# Patient Record
Sex: FEMALE | Race: WHITE | Employment: FULL TIME | URBAN - METROPOLITAN AREA
[De-identification: names, ages, dates, MRNs, and addresses within clinical notes are randomized per-mention and may not be internally consistent; named-entity substitution may affect disease eponyms.]

---

## 2023-02-14 ENCOUNTER — HOSPITAL ENCOUNTER (EMERGENCY)
Age: 23
Discharge: HOME | End: 2023-02-14
Attending: EMERGENCY MEDICINE
Payer: COMMERCIAL

## 2023-02-14 VITALS
SYSTOLIC BLOOD PRESSURE: 112 MMHG | HEIGHT: 63 IN | DIASTOLIC BLOOD PRESSURE: 76 MMHG | TEMPERATURE: 99.6 F | RESPIRATION RATE: 20 BRPM | BODY MASS INDEX: 31.18 KG/M2 | HEART RATE: 71 BPM | WEIGHT: 176 LBS | OXYGEN SATURATION: 100 %

## 2023-02-14 DIAGNOSIS — R42 DIZZINESS: Primary | ICD-10-CM

## 2023-02-14 DIAGNOSIS — R55 NEAR SYNCOPE: ICD-10-CM

## 2023-02-14 LAB
ALBUMIN SERPL-MCNC: 4.1 G/DL (ref 3.5–5.2)
ALP BLD-CCNC: 119 U/L (ref 35–104)
ALT SERPL-CCNC: 15 U/L (ref 0–32)
ANION GAP SERPL CALCULATED.3IONS-SCNC: 11 MMOL/L (ref 7–16)
AST SERPL-CCNC: 18 U/L (ref 0–31)
BACTERIA: ABNORMAL /HPF
BASOPHILS ABSOLUTE: 0 E9/L (ref 0–0.2)
BASOPHILS RELATIVE PERCENT: 0 % (ref 0–2)
BILIRUB SERPL-MCNC: 0.5 MG/DL (ref 0–1.2)
BILIRUBIN URINE: NEGATIVE
BLOOD, URINE: NEGATIVE
BUN BLDV-MCNC: 10 MG/DL (ref 6–20)
CALCIUM SERPL-MCNC: 9.2 MG/DL (ref 8.6–10.2)
CHLORIDE BLD-SCNC: 103 MMOL/L (ref 98–107)
CLARITY: ABNORMAL
CO2: 24 MMOL/L (ref 22–29)
COLOR: YELLOW
CREAT SERPL-MCNC: 0.7 MG/DL (ref 0.5–1)
EKG ATRIAL RATE: 74 BPM
EKG P AXIS: 27 DEGREES
EKG P-R INTERVAL: 152 MS
EKG Q-T INTERVAL: 380 MS
EKG QRS DURATION: 80 MS
EKG QTC CALCULATION (BAZETT): 421 MS
EKG R AXIS: 78 DEGREES
EKG T AXIS: 44 DEGREES
EKG VENTRICULAR RATE: 74 BPM
EOSINOPHILS ABSOLUTE: 0 E9/L (ref 0.05–0.5)
EOSINOPHILS RELATIVE PERCENT: 0 % (ref 0–6)
EPITHELIAL CELLS, UA: ABNORMAL /HPF
GFR SERPL CREATININE-BSD FRML MDRD: >60 ML/MIN/1.73
GLUCOSE BLD-MCNC: 94 MG/DL (ref 74–99)
GLUCOSE URINE: NEGATIVE MG/DL
HCG, URINE, POC: NEGATIVE
HCT VFR BLD CALC: 42.1 % (ref 34–48)
HEMOGLOBIN: 13.3 G/DL (ref 11.5–15.5)
KETONES, URINE: NEGATIVE MG/DL
LEUKOCYTE ESTERASE, URINE: ABNORMAL
LYMPHOCYTES ABSOLUTE: 3.91 E9/L (ref 1.5–4)
LYMPHOCYTES RELATIVE PERCENT: 38 % (ref 20–42)
Lab: NORMAL
MCH RBC QN AUTO: 25.3 PG (ref 26–35)
MCHC RBC AUTO-ENTMCNC: 31.6 % (ref 32–34.5)
MCV RBC AUTO: 80 FL (ref 80–99.9)
MONOCYTES ABSOLUTE: 0.93 E9/L (ref 0.1–0.95)
MONOCYTES RELATIVE PERCENT: 9 % (ref 2–12)
NEGATIVE QC PASS/FAIL: NORMAL
NEUTROPHILS ABSOLUTE: 5.46 E9/L (ref 1.8–7.3)
NEUTROPHILS RELATIVE PERCENT: 53 % (ref 43–80)
NITRITE, URINE: NEGATIVE
PDW BLD-RTO: 13.2 FL (ref 11.5–15)
PH UA: 6.5 (ref 5–9)
PLATELET # BLD: 298 E9/L (ref 130–450)
PMV BLD AUTO: 9.6 FL (ref 7–12)
POSITIVE QC PASS/FAIL: NORMAL
POTASSIUM REFLEX MAGNESIUM: 4.1 MMOL/L (ref 3.5–5)
PROTEIN UA: NEGATIVE MG/DL
RBC # BLD: 5.26 E12/L (ref 3.5–5.5)
RBC UA: ABNORMAL /HPF (ref 0–2)
SODIUM BLD-SCNC: 138 MMOL/L (ref 132–146)
SPECIFIC GRAVITY UA: 1.02 (ref 1–1.03)
TOTAL PROTEIN: 7 G/DL (ref 6.4–8.3)
TROPONIN, HIGH SENSITIVITY: <6 NG/L (ref 0–9)
UROBILINOGEN, URINE: 0.2 E.U./DL
WBC # BLD: 10.3 E9/L (ref 4.5–11.5)
WBC UA: ABNORMAL /HPF (ref 0–5)

## 2023-02-14 PROCEDURE — 84484 ASSAY OF TROPONIN QUANT: CPT

## 2023-02-14 PROCEDURE — 99284 EMERGENCY DEPT VISIT MOD MDM: CPT

## 2023-02-14 PROCEDURE — 81001 URINALYSIS AUTO W/SCOPE: CPT

## 2023-02-14 PROCEDURE — 85025 COMPLETE CBC W/AUTO DIFF WBC: CPT

## 2023-02-14 PROCEDURE — 2580000003 HC RX 258: Performed by: STUDENT IN AN ORGANIZED HEALTH CARE EDUCATION/TRAINING PROGRAM

## 2023-02-14 PROCEDURE — 93010 ELECTROCARDIOGRAM REPORT: CPT | Performed by: INTERNAL MEDICINE

## 2023-02-14 PROCEDURE — 93005 ELECTROCARDIOGRAM TRACING: CPT | Performed by: STUDENT IN AN ORGANIZED HEALTH CARE EDUCATION/TRAINING PROGRAM

## 2023-02-14 PROCEDURE — 80053 COMPREHEN METABOLIC PANEL: CPT

## 2023-02-14 RX ORDER — RIZATRIPTAN BENZOATE 5 MG/1
5 TABLET ORAL
COMMUNITY

## 2023-02-14 RX ORDER — 0.9 % SODIUM CHLORIDE 0.9 %
1000 INTRAVENOUS SOLUTION INTRAVENOUS ONCE
Status: COMPLETED | OUTPATIENT
Start: 2023-02-14 | End: 2023-02-14

## 2023-02-14 RX ORDER — PANTOPRAZOLE SODIUM 40 MG/1
40 GRANULE, DELAYED RELEASE ORAL
COMMUNITY

## 2023-02-14 RX ORDER — ONDANSETRON 4 MG/1
4 TABLET, FILM COATED ORAL EVERY 8 HOURS PRN
COMMUNITY

## 2023-02-14 RX ADMIN — SODIUM CHLORIDE 1000 ML: 9 INJECTION, SOLUTION INTRAVENOUS at 04:40

## 2023-02-14 ASSESSMENT — PAIN - FUNCTIONAL ASSESSMENT
PAIN_FUNCTIONAL_ASSESSMENT: NONE - DENIES PAIN
PAIN_FUNCTIONAL_ASSESSMENT: NONE - DENIES PAIN

## 2023-02-14 ASSESSMENT — LIFESTYLE VARIABLES: HOW OFTEN DO YOU HAVE A DRINK CONTAINING ALCOHOL: NEVER

## 2023-02-14 NOTE — DISCHARGE INSTRUCTIONS
Increase fluids  Follow-up with primary care doctor  If you notice any new worrisome symptoms please return to emergency department for evaluation

## 2023-02-14 NOTE — LETTER
[facility] Emergency Department  [address]   Phone: [phone]               February 14, 2023    Patient: Kita Lucas   YOB: 2000   Date of Visit: 2/14/2023       To Whom It May Concern:    Kita Lucas was seen and treated in our emergency department on 2/14/2023. She can return to work Tuesday 02/14/2023.       Sincerely,       Estefanía Silverman RN BSN CCRN [facility]        Signature:__________________________________

## 2023-02-14 NOTE — ED PROVIDER NOTES
[address]        Pt Name: Denise Hermosillo  MRN: [id number]  [place] 2000  Date of evaluation: 2/14/2023  Provider: Valerie Nuñez DO  PCP: No primary care provider on file. Note Started: 6:18 AM EST 2/14/23    CHIEF COMPLAINT       Chief Complaint   Patient presents with    Dizziness     Pt states that around 2300 the dizziness started, pt states that she started a new migraine medication that tends to make her dizzy but not this bad       HISTORY OF PRESENT ILLNESS: 1 or more Elements   History From: Patient    Limitations to history : None    Denise Hermosillo is a 20 y.o. female Past medical history migraines as well as IBS. Patient presents with chief complaint of dizziness and near syncopal episodes. Patient states that last night around 11 PM she had multiple episodes of dizziness where she felt as though she may pass out. Patient states that symptoms have been moderate in severity intermittent since onset. She notes that symptoms have been worsening since she started a new migraine medication. Patient denies any chest pain or shortness of breath. Patient states that she has been eating and drinking well. Patient denies any fevers, chills, nausea, vomiting, chest pain, Ryan pain, constipation or diarrhea. Nursing Notes were all reviewed and agreed with or any disagreements were addressed in the HPI. REVIEW OF SYSTEMS :           Positives and Pertinent negatives as per HPI.      SURGICAL HISTORY     Past Surgical History:   Procedure Laterality Date    UPPER GASTROINTESTINAL ENDOSCOPY         CURRENTMEDICATIONS       Previous Medications    ONDANSETRON (ZOFRAN) 4 MG TABLET    Take 4 mg by mouth every 8 hours as needed for Nausea or Vomiting    PANTOPRAZOLE SODIUM (PROTONIX) 40 MG PACK PACKET    Take 40 mg by mouth every morning (before breakfast)    RIZATRIPTAN (MAXALT) 5 MG TABLET    Take 5 mg by mouth once as needed for Migraine May repeat in 2 hours if needed       ALLERGIES     Patient has no known allergies. FAMILYHISTORY     History reviewed. No pertinent family history. SOCIAL HISTORY       Social History     Tobacco Use    Smoking status: Never    Smokeless tobacco: Never       SCREENINGS        [place] Coma Scale  Eye Opening: Spontaneous  Best Verbal Response: Oriented  Best Motor Response: Obeys commands  [place] Coma Scale Score: 15                CIWA Assessment  BP: 122/77  Heart Rate: 86           PHYSICAL EXAM  1 or more Elements     ED Triage Vitals [02/14/23 0335]   BP Temp Temp Source Heart Rate Resp SpO2 Height Weight   122/77 99.6 °F (37.6 °C) Oral 86 16 99 % 5' 3\" (1.6 m) 176 lb (79.8 kg)              Constitutional/General: Alert and oriented x3  Head: Normocephalic and atraumatic  Eyes: PERRL, EOMI, conjunctiva normal, sclera non icteric  ENT:  Oropharynx clear, handling secretions, no trismus, no asymmetry of the posterior oropharynx or uvular edema  Neck: Supple, full ROM, no stridor, no meningeal signs  Respiratory: Lungs clear to auscultation bilaterally, no wheezes, rales, or rhonchi. Not in respiratory distress  Cardiovascular:  Regular rate. Regular rhythm. No murmurs, no gallops, no rubs. 2+ distal pulses. Equal extremity pulses. Chest: No chest wall tenderness  GI:  Abdomen Soft, Non tender, Non distended. +BS. No rebound, guarding, or rigidity. No pulsatile masses. Musculoskeletal: Moves all extremities x 4. Warm and well perfused, no clubbing, no cyanosis, no edema. Capillary refill <3 seconds  Integument: skin warm and dry. No rashes. Neurologic: On neurological exam no focal deficits, pupils equal round reactive to light, extraocular moods are intact, muscle strength 5 out of 5 to bilateral upper and lower extremities, sensation intact to light touch. No ataxia noted finger-to-nose or heel-to-shin.   Psychiatric: Normal Affect            DIAGNOSTIC RESULTS LABS:    Labs Reviewed   CBC WITH AUTO DIFFERENTIAL - Abnormal; Notable for the following components:       Result Value    MCH 25.3 (*)     MCHC 31.6 (*)     All other components within normal limits   COMPREHENSIVE METABOLIC PANEL W/ REFLEX TO MG FOR LOW K - Abnormal; Notable for the following components:    Alkaline Phosphatase 119 (*)     All other components within normal limits   URINALYSIS WITH MICROSCOPIC - Abnormal; Notable for the following components:    Leukocyte Esterase, Urine TRACE (*)     Bacteria, UA RARE (*)     All other components within normal limits   TROPONIN   POC PREGNANCY UR-QUAL       As interpreted by me, the above displayed labs are abnormal. All other labs obtained during this visit were within normal range or not returned as of this dictation. EKG Interpretation  Interpreted by emergency department physician, Beatriz Pérez DO    EKG #1:  Interpreted by emergency department physician unless otherwise noted. Time:  417    Rate: 74  Rhythm: Sinus. Interpretation: EKG obtained demonstrated normal sinus rhythm, rate 74, normal axis, QTc 421, no acute ST segment changes. .  Comparison: no previous EKG. RADIOLOGY:   Non-plain film images such as CT, Ultrasound and MRI are read by the radiologist. Plain radiographic images are visualized and preliminarily interpreted by the ED Provider with the below findings:        Interpretation per the Radiologist below, if available at the time of this note:    No orders to display     No results found. No results found. PROCEDURES   Unless otherwise noted below, none        PAST MEDICAL HISTORY/Chronic Conditions Affecting Care      has a past medical history of GERD (gastroesophageal reflux disease), IBS (irritable bowel syndrome), and Migraines.      EMERGENCY DEPARTMENT COURSE    Vitals:    Vitals:    02/14/23 0335   BP: 122/77   Pulse: 86   Resp: 16   Temp: 99.6 °F (37.6 °C)   TempSrc: Oral   SpO2: 99%   Weight: 176 lb (79.8 kg)   Height: 5' 3\" (1.6 m)       Patient was given the following medications:  Medications   0.9 % sodium chloride bolus (1,000 mLs IntraVENous New Bag 2/14/23 8590)           Is this patient to be included in the SEP-1 Core Measure due to severe sepsis or septic shock? No Exclusion criteria - the patient is NOT to be included for SEP-1 Core Measure due to: Infection is not suspected        Medical Decision Making/Differential Diagnosis:    CC/HPI Summary, Social Determinants of health, Records Reviewed, DDx, testing done/not done, ED Course, Reassessment, disposition considerations/shared decision making with patient, consults, disposition:      ED Course as of 02/14/23 0618   Tue Feb 14, 2023   0409 ATTENDING PROVIDER ATTESTATION:     I have personally performed and/or participated in the history, exam, medical decision making, and procedures and agree with all pertinent clinical information unless otherwise noted. I have also reviewed and agree with the past medical, family and social history unless otherwise noted. I have discussed this patient in detail with the resident, and provided the instruction and education regarding patient here states that tonight she has felt dizzy and woozy and may have passed out or at least collapsed from the symptoms while at work. Symptoms started after taking her migraine medicines. Denies current headache. Denies associated or current chest pain, palpitations or shortness of breath. No recent traveling or surgeries. No leg pain or swelling. No history of blood clots. .  My findings/plan: Patient laying the bed resting comfortably no distress. Heart rate regular, lungs are clear and equal.  Abdomen soft and nontender. No pretibial edema or calf pain. No jaundice or icterus.        [NC]   0885 PERC Rule for PE for Age <50:      Age = 48 Negative    HR = 100 Negative    O2 Sat on Room Air < 95% Negative    Prior History of DVT/PE Negative    Recent Trauma or Surgery Negative    Hemoptysis Negative    Exogenous Estrogen/Hormone Use Negative    Unilateral Extgremity Swelling Negative    * If ANY Criteria are positive, the PERC rule is not satisfied and cannot be used to rule out PE in this patient. [NC]   8126 EKG normal sinus rhythm rate of 74, normal axis, normal conduction, no acute ischemic ST-T wave changes. Otherwise agree with resident. [NC]      ED Course User Index  [NC] Javierey Constantino, DO        History from : Patient    Limitations to history : None    Chronic Conditions: Migraines    CONSULTS: (Who and What was discussed)  None    Discussion with Other Profesionals : None    Social Determinants : None    Records Reviewed : None    CC/HPI Summary, DDx, ED Course, and Reassessment: Patient is a 40-year-old female past medical history migraines as well as IBS. Patient presents with complaint of dizziness. Vital signs stable presentation. On physical exam heart regular rate and rhythm, lungs clear to auscultation bilaterally, abdomen soft nontender no rigidity rebound or guarding. On neuro exam no focal deficits, pupils equal round reactive to light, extraocular wounds are intact, muscle strength 5 out of 5 to bilateral upper and lower extremities, sensation intact to light touch, no ataxia noted. Differential diagnosis includes dehydration, vasovagal syncope, UTI, arrhythmia. EKG obtained demonstrate no acute ischemic changes. Laboratory work obtained CBC unremarkable, CMP unremarkable, troponin less than 6, urine pregnancy test negative, urinalysis nonaddictive infection. Patient patient given 1 L of IV fluids with improvement in symptoms. Plan consistent with dizziness and near syncope likely secondary to dehydration. Patient is overall well-appearing neuro exam is reassuring. Patient appropriate for discharge with outpatient follow-up. Patient instructed increase fluids and to follow-up with primary care doctor.   If patient notes any new worrisome symptoms patient was instructed to return to the emergency department for evaluation. Plan of care discussed with patient occluding discharge, all questions were answered, patient was agreement plan of care discharged home in stable condition. Disposition Considerations (Tests not ordered but considered, Shared Decision Making, Pt Expectation of Test or Tx.): Shared decision making discussed with patient. Repeat evaluation shows notes improvement in symptoms. Patient is neurologically intact. Patient is appropriate for discharge with outpatient follow-up. FINAL IMPRESSION      1. Dizziness    2.  Near syncope          DISPOSITION/PLAN     DISPOSITION Decision To Discharge 02/14/2023 06:17:19 AM      PATIENT REFERRED TO:  [facility]) Physicians Pre-Service  [phone]  Call   for PCP referral    [facility] Emergency Department  [address] [address]  [phone]  Go to   If symptoms worsen      DISCHARGE MEDICATIONS:  New Prescriptions    No medications on file       DISCONTINUED MEDICATIONS:  Discontinued Medications    No medications on file              (Please note that portions of this note were completed with a voice recognition program.  Efforts were made to edit the dictations but occasionally words are mis-transcribed.)    Ella Rubin DO (electronically signed)           Seth Newton DO  Resident  02/14/23 8769